# Patient Record
Sex: FEMALE | Race: OTHER | HISPANIC OR LATINO | URBAN - METROPOLITAN AREA
[De-identification: names, ages, dates, MRNs, and addresses within clinical notes are randomized per-mention and may not be internally consistent; named-entity substitution may affect disease eponyms.]

---

## 2022-08-06 ENCOUNTER — EMERGENCY (EMERGENCY)
Facility: HOSPITAL | Age: 72
LOS: 0 days | Discharge: HOME | End: 2022-08-06
Attending: STUDENT IN AN ORGANIZED HEALTH CARE EDUCATION/TRAINING PROGRAM
Payer: MEDICAID

## 2022-08-06 VITALS
TEMPERATURE: 98 F | DIASTOLIC BLOOD PRESSURE: 60 MMHG | SYSTOLIC BLOOD PRESSURE: 167 MMHG | RESPIRATION RATE: 18 BRPM | HEART RATE: 75 BPM | OXYGEN SATURATION: 97 %

## 2022-08-06 VITALS
DIASTOLIC BLOOD PRESSURE: 56 MMHG | SYSTOLIC BLOOD PRESSURE: 174 MMHG | TEMPERATURE: 98 F | HEART RATE: 77 BPM | RESPIRATION RATE: 18 BRPM | OXYGEN SATURATION: 96 %
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
## 2022-08-06 DIAGNOSIS — Z79.84 LONG TERM (CURRENT) USE OF ORAL HYPOGLYCEMIC DRUGS: ICD-10-CM

## 2022-08-06 DIAGNOSIS — M54.2 CERVICALGIA: ICD-10-CM
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
## 2022-08-06 DIAGNOSIS — Z79.82 LONG TERM (CURRENT) USE OF ASPIRIN: ICD-10-CM

## 2022-08-06 DIAGNOSIS — M25.559 PAIN IN UNSPECIFIED HIP: ICD-10-CM

## 2022-08-06 DIAGNOSIS — H92.01 OTALGIA, RIGHT EAR: ICD-10-CM

## 2022-08-06 DIAGNOSIS — Z79.01 LONG TERM (CURRENT) USE OF ANTICOAGULANTS: ICD-10-CM

## 2022-08-06 DIAGNOSIS — E11.9 TYPE 2 DIABETES MELLITUS WITHOUT COMPLICATIONS: ICD-10-CM

## 2022-08-06 DIAGNOSIS — S00.81XA ABRASION OF OTHER PART OF HEAD, INITIAL ENCOUNTER: ICD-10-CM

## 2022-08-06 DIAGNOSIS — M54.6 PAIN IN THORACIC SPINE: ICD-10-CM

## 2022-08-06 LAB — GLUCOSE BLDC GLUCOMTR-MCNC: 286 MG/DL — HIGH (ref 70–99)

## 2022-08-06 PROCEDURE — 70450 CT HEAD/BRAIN W/O DYE: CPT | Mod: 26,MA

## 2022-08-06 PROCEDURE — 99284 EMERGENCY DEPT VISIT MOD MDM: CPT | Mod: 25

## 2022-08-06 PROCEDURE — 82962 GLUCOSE BLOOD TEST: CPT

## 2022-08-06 PROCEDURE — 70450 CT HEAD/BRAIN W/O DYE: CPT | Mod: MA

## 2022-08-06 PROCEDURE — 72125 CT NECK SPINE W/O DYE: CPT | Mod: 26,MA

## 2022-08-06 PROCEDURE — 99285 EMERGENCY DEPT VISIT HI MDM: CPT

## 2022-08-06 PROCEDURE — 72125 CT NECK SPINE W/O DYE: CPT | Mod: MA

## 2022-08-06 NOTE — ED PROVIDER NOTE - PROGRESS NOTE DETAILS
Lisa Mota for attending Dr. Crowley : pt. ambulating well with assistance. CT head negative for acute pathology CT cervical spine negative for fracture. An extensive discussion was had with the patient regarding labs/imaging and tests prior to discharge. We discussed in depth the importance of follow up for continuing medical care as an outpatient. The patient was advised to return the Emergency Department for worsening or persistent symptoms, and/or any new or concerning symptoms.
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
## 2022-08-06 NOTE — ED ADULT NURSE NOTE - NSIMPLEMENTINTERV_GEN_ALL_ED
Implemented All Fall Risk Interventions:  [place] to call system. Call bell, personal items and telephone within reach. Instruct patient to call for assistance. Room bathroom lighting operational. Non-slip footwear when patient is off stretcher. Physically safe environment: no spills, clutter or unnecessary equipment. Stretcher in lowest position, wheels locked, appropriate side rails in place. Provide visual cue, wrist band, yellow gown, etc. Monitor gait and stability. Monitor for mental status changes and reorient to person, place, and time. Review medications for side effects contributing to fall risk. Reinforce activity limits and safety measures with patient and family.

## 2022-08-06 NOTE — ED PROVIDER NOTE - NSFOLLOWUPINSTRUCTIONS_ED_ALL_ED_FT
Seek immediate medical assistance for any new or worsening symptoms. If you have issues obtaining follow up, please call: [phone] or [phone]  to obtain a doctor or specialist who takes your insurance in your area.     Fall Prevention    WHAT YOU NEED TO KNOW:    Fall prevention includes ways to make your home and other areas safer. It also includes ways you can move more carefully to prevent a fall. Health conditions that cause changes in your blood pressure, vision, or muscle strength and coordination may increase your risk for falls. Medicines may also increase your risk for falls if they make you dizzy, weak, or sleepy.    DISCHARGE INSTRUCTIONS:    Call 911 or have someone else call if:  You have fallen and are unconscious.  You have fallen and cannot move part of your body.  Contact your healthcare provider if:  You have fallen and have pain or a headache.  You have questions or concerns about your condition or care.  Fall prevention tips:  Stand or sit up slowly. This may help you keep your balance and prevent falls.  Use assistive devices as directed. Your healthcare provider may suggest that you use a cane or walker to help you keep your balance. You may need to have grab bars put in your bathroom near the toilet or in the shower.  Wear shoes that fit well and have soles that . Wear shoes both inside and outside. Use slippers with good . Do not wear shoes with high heels.  Wear a personal alarm. This is a device that allows you to call 911 if you fall and need help. Ask your healthcare provider for more information.  Stay active. Exercise can help strengthen your muscles and improve your balance. Your healthcare provider may recommend water aerobics or walking. He or she may also recommend physical therapy to improve your coordination. Never start an exercise program without talking to your healthcare provider first.  Walking for Exercise  Manage your medical conditions. Keep all appointments with your healthcare providers. Visit your eye doctor as directed.  Home safety tips:  Fall Prevention for Adults  Add items to prevent falls in the bathroom. Put nonslip strips on your bath or shower floor to prevent you from slipping. Use a bath mat if you do not have carpet in the bathroom. This will prevent you from falling when you step out of the bath or shower. Use a shower seat so you do not need to stand while you shower. Sit on the toilet or a chair in your bathroom to dry yourself and put on clothing. This will prevent you from losing your balance from drying or dressing yourself while you are standing.  Keep paths clear. Remove books, shoes, and other objects from walkways and stairs. Place cords for telephones and lamps out of the way so that you do not need to walk over them. Tape them down if you cannot move them. Remove small rugs. If you cannot remove a rug, secure it with double-sided tape. This will prevent you from tripping.  Install bright lights in your home. Use night lights to help light paths to the bathroom or kitchen. Always turn on the light before you start walking.  Keep items you use often on shelves within reach. Do not use a step stool to help you reach an item.  Paint or place reflective tape on the edges of your stairs. This will help you see the stairs better.  Follow up with your healthcare provider as directed: Write down your questions so you remember to ask them during your visits.    Prevención de caídas    LO QUE NECESITA SABER:    La prevención de caídas incluye formas de hacer más seguro santos hogar y otras áreas. También incluye cómo moverse más cuidadosamente para evitar misael caída. Las condiciones médicas que provocan cambios en la presión arterial, la visión o la fuerza muscular y la coordinación pueden llegar a aumentar santos riesgo de caídas. Los medicamentos también pueden aumentar santos riesgo de caídas si le provocan mareos, debilidad o somnolencia.    [facility]CCIONES SOBRE [facility]:  Llame al 911 o pídale a alguien más que lo bee si:  Se ha caído y está inconsciente.  Se ha caído y no puede  parte de santos cuerpo.  Comuníquese con santos médico si:  Se ha caído y tiene dolor en el cuerpo o dolor de emily.  Usted tiene preguntas o inquietudes acerca de santos condición o cuidado.  Recomendaciones para prevenir caídas:  Póngase de pie o siéntese despacio.[place] puede ayudarlo a mantener santos equilibrio y prevenir misael caída.  Use dispositivos de apoyo keysha se le indique.Santos médico le puede recomendar que use un bastón o un caminador para que lo asista en santos equilibrio. Es posible que necesite que le instalen barandas en el baño cerca al inodoro o en la ducha.  Use calzado que le quede reyna y tenga suelas antideslizantes.Use zapatos dentro y fuera de casa. Utilice pantunflas con misael suela de buen agarre. No use zapatos con tacones altos.  Utilizar un dispositivo de alerta médica.Isabel es un dispositivo que puede llevar puesto y le permite llamar al 911 en [place] que se haya caído y necesite ayuda. Pídale a santos médico más información.  Manténgase activo.El ejercicio puede ayudar a fortalecer los músculos y mejorar santos equilibrio. Santos médico le puede recomendar hacer ejercicios aeróbicos acuáticos o caminar. También le puede recomendar la fisioterapia para mejorar santos coordinación. Nunca comience un programa de ejercicios sin consultarlo ngoc con santos médico.  Caminar para ejercitarse  Controle leatha afecciones médicas.[place] con todas las citas con leatha médicos. Visite al oculista keysha se le ha indicado.  Recomendaciones para la seguridad en el hogar:  Prevención de caídas para adultos  Agregue elementos para prevenir caídas en el baño.Coloque tiras antideslizantes en el piso de la ducha o de la bañera para evitar resbalones. Use misael alfombra de baño si no tiene alfombra en el cuarto de baño. [place] evitará que se caiga cuando sale de la ducha o la bañera. Use un asiento de ducha de modo que no necesitará ponerse de pie mientras se ducha. Siéntese en el inodoro o en misael silla en el cuarto de baño para secarse y vestirse. [place] impedirá que pierda el equilibrio mientras se seca o se viste estando de pie.  Mantener los pasillos despejados.Despeje las vías y las escaleras por donde camina retirando los libros, los zapatos u otros objetos. Coloque los cables del teléfono y las lámparas fuera de santos michelle para que no tenga que caminar sobre ellos. Si no puede moverlos, sujételos con cinta adhesiva. Retire los tapetes pequeños. Si no puede quitar un tapete, sujételo con cinta de doble abby. Lo cual evitará que se tropiece con éstos.  Instale misael buena iluminación en santos hogar.Use lamparillas de noche para ayudar a iluminar los pasillos al baño o a la cocina. Siempre encienda la jakob antes de empezar a caminar.  Mantenga los objetos que usa con frecuencia en estantes dentro de santos alcance.No use un banquito para intentar alcanzar un elemento.  Pinte o coloque cinta reflectiva en los bordes de las escaleras.Lo cual puede ayudarle a ania mejor las escaleras.  Acuda a leatha consultas de control con santos médico según le indicaron.Anote leatha preguntas para que se acuerde de hacerlas clif leatha visitas.

## 2022-08-06 NOTE — ED ADULT NURSE NOTE - OBJECTIVE STATEMENT
[place]. Pt reports falling on her face. Abrasion noted to the right forehead. Denies pain anywhere else. A&Ox4. Family at bedside. C collar in place. Pt on baby ASA.
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
## 2022-08-06 NOTE — ED PROVIDER NOTE - PATIENT PORTAL LINK FT
You can access the FollowMyHealth Patient Portal offered by [facility] by registering at the following website: [email or web address]. By joining [facility]’s FollowMyHealth portal, you will also be able to view your health information using other applications (apps) compatible with our system.